# Patient Record
Sex: FEMALE
[De-identification: names, ages, dates, MRNs, and addresses within clinical notes are randomized per-mention and may not be internally consistent; named-entity substitution may affect disease eponyms.]

---

## 2019-01-26 ENCOUNTER — HOSPITAL ENCOUNTER (EMERGENCY)
Dept: HOSPITAL 31 - C.ER | Age: 19
LOS: 1 days | Discharge: HOME | End: 2019-01-27
Payer: SELF-PAY

## 2019-01-26 VITALS — OXYGEN SATURATION: 99 % | SYSTOLIC BLOOD PRESSURE: 102 MMHG | DIASTOLIC BLOOD PRESSURE: 70 MMHG

## 2019-01-26 DIAGNOSIS — O20.0: Primary | ICD-10-CM

## 2019-01-26 DIAGNOSIS — Z3A.01: ICD-10-CM

## 2019-01-26 LAB
ANISOCYTOSIS BLD QL SMEAR: SLIGHT
BASOPHILS # BLD AUTO: 0 K/UL (ref 0–0.2)
BASOPHILS NFR BLD: 0.3 % (ref 0–2)
BILIRUB UR-MCNC: NEGATIVE MG/DL
BUN SERPL-MCNC: 6 MG/DL (ref 7–17)
CALCIUM SERPL-MCNC: 8.9 MG/DL (ref 8.6–10.4)
EOSINOPHIL # BLD AUTO: 0 K/UL (ref 0–0.7)
EOSINOPHIL NFR BLD: 0.4 % (ref 0–4)
ERYTHROCYTE [DISTWIDTH] IN BLOOD BY AUTOMATED COUNT: 13.3 % (ref 11.5–14.5)
GFR NON-AFRICAN AMERICAN: > 60
GLUCOSE UR STRIP-MCNC: NORMAL MG/DL
HGB BLD-MCNC: 12 G/DL (ref 11–16)
HYPOCHROMIC: SLIGHT
LEUKOCYTE ESTERASE UR-ACNC: (no result) LEU/UL
LG PLATELETS BLD QL SMEAR: PRESENT
LYMPHOCYTE: 7 % (ref 20–40)
LYMPHOCYTES # BLD AUTO: 0.7 K/UL (ref 1–4.3)
LYMPHOCYTES NFR BLD AUTO: 7 % (ref 20–40)
MCH RBC QN AUTO: 28.4 PG (ref 27–31)
MCHC RBC AUTO-ENTMCNC: 33.6 G/DL (ref 33–37)
MCV RBC AUTO: 84.4 FL (ref 81–99)
MONOCYTE: 8 % (ref 0–10)
MONOCYTES # BLD: 0.8 K/UL (ref 0–0.8)
MONOCYTES NFR BLD: 8.4 % (ref 0–10)
NEUTROPHILS # BLD: 7.9 K/UL (ref 1.8–7)
NEUTROPHILS NFR BLD AUTO: 83.9 % (ref 50–75)
NEUTROPHILS NFR BLD AUTO: 84 % (ref 50–75)
NEUTS BAND NFR BLD: 1 % (ref 0–2)
NRBC BLD AUTO-RTO: 0 % (ref 0–2)
PH UR STRIP: 6 [PH] (ref 5–8)
PLATELET # BLD EST: NORMAL 10*3/UL
PLATELET # BLD: 241 K/UL (ref 130–400)
PMV BLD AUTO: 8.3 FL (ref 7.2–11.7)
POLYCHROMIC: SLIGHT
PROT UR STRIP-MCNC: NEGATIVE MG/DL
RBC # BLD AUTO: 4.22 MIL/UL (ref 3.8–5.2)
RBC # UR STRIP: NEGATIVE /UL
SP GR UR STRIP: 1.01 (ref 1–1.03)
SQUAMOUS EPITHIAL: 2 /HPF (ref 0–5)
TOTAL CELLS COUNTED BLD: 100
TOXIC GRANULES BLD QL SMEAR: PRESENT
UROBILINOGEN UR-MCNC: NORMAL MG/DL (ref 0.2–1)
WBC # BLD AUTO: 9.4 K/UL (ref 4.8–10.8)

## 2019-01-27 VITALS — TEMPERATURE: 99.1 F | HEART RATE: 110 BPM | RESPIRATION RATE: 20 BRPM

## 2019-01-27 NOTE — US
Pelvic ultrasound 



HISTORY:

Pregnancy.  Vaginal bleeding. 



Comparison: None available. 



Technique: Real-time sonography was performed through the pelvis. 



Findings: 



Uterus: 8.3 x 5.8 x 6.1 centimeters.  Heterogeneous echotexture.  

Anteverted. 



Endometrium is prominent measuring 1.7 centimeters. 



Cervix is closed measuring 3.1 centimeters. 



Intrauterine gestational sac measures 9 millimeters. 



Yolk sac measures 2.2 millimeters. 



No discrete fetal pole visualized. 



Suggestion of subchorionic hemorrhage adjacent to the gestational sac 

measuring 6 x 6 x 7 millimeters. 



No free fluid in the pelvic cul-de-sac. 



Right ovary: 3.3 x 2.6 x 2.3 centimeters.  Normal flow. Hypoechoic 

right ovarian cyst measuring 1.2 x 1.5 x 1.2 centimeters. 



Left ovary: 2.5 x 1.3 x 2.6 centimeters.  Normal flow. 



Impression: 



1. Suggestion of an intrauterine gestational sac measuring 9 

millimeters.  Suggestion of a yolk sac measuring 2.2 millimeters.  

These are too small to adequately date.  Continued interval follow-up 

is recommended.  Fetal pole or heart rate not visualized at this 

juncture. 



2. Heterogeneous focus adjacent to the gestational sac suggestive for 

possible subchorionic hemorrhage measuring up to 7 millimeters.  

Clinical correlation. 



3. Prominent endometrium measuring 1.7 centimeters. 



4. 1.5 centimeter right ovarian cyst. 



Limited 1st trimester ultrasound for viability purposes only.  

Continued interval followup with serial ultrasound, serial HCG levels,

 and gynecological consultation would be helpful if clinically 

indicated. 



A preliminary report was generated at 10:45 p.m. on 01/26/2019 by Dr. Clayton Bullard from [facility]

## 2021-05-30 NOTE — C.PDOC
History Of Present Illness


18 year old female presents to the ER with a complaint of vaginal bleeding 

described as vaginal spotting for the past 1 day. Patient is  with 1 

miscarriage. She is 5 weeks pregnant by LMP and has not seen anyone for this 

pregnancy. Denies any other complaints.


Chief Complaint (Nursing): Abdominal Pain


History Per: Patient


History/Exam Limitations: no limitations


Onset/Duration Of Symptoms: Days (1)


Current Symptoms Are (Timing): Still Present


Recent travel outside of the [place]: No


Abnormal Vaginal Bleeding: Yes





Past Medical History


Reviewed: Historical Data, Nursing Documentation, Vital Signs


Vital Signs: 





                                Last Vital Signs











Temp  99.1 F   19 00:02


 


Pulse  110 H  19 00:02


 


Resp  20   19 00:02


 


BP  102/70   19 21:17


 


Pulse Ox  99   19 00:02











Family History: States: No Known Family Hx





- Social History


Hx Alcohol Use: No


Hx Substance Use: No





- Immunization History


Hx Tetanus Toxoid Vaccination: No


Hx Influenza Vaccination: No


Hx Pneumococcal Vaccination: No





Review Of Systems


Constitutional: Negative for: Fever, Chills


Eyes: Negative for: Pain, Redness


ENT: Negative for: Mouth Swelling


Cardiovascular: Negative for: Chest Pain


Respiratory: Negative for: Cough, Shortness of Breath


Gastrointestinal: Negative for: Nausea, Vomiting, Diarrhea


Genitourinary: Positive for: Vaginal Bleeding.  Negative for: Dysuria


Musculoskeletal: Negative for: Back Pain


Skin: Negative for: Rash


Neurological: Negative for: Weakness, Numbness, Dizziness





Physical Exam





- Physical Exam


Appears: Well, Non-toxic, No Acute Distress


Skin: Normal Color, Warm


Head: Atraumatic, Normacephalic


Eye(s): bilateral: Normal Inspection, PERRL, EOMI


Oral Mucosa: Moist


Neck: Normal ROM, No Midline Cervical Tenderness, No Paracervical Tenderness, 

Supple


Chest: Symmetrical, No Tenderness


Respiratory: No Accessory Muscle Use, Other (Normal inspiratory effort)


Gastrointestinal/Abdominal: Soft, No Tenderness


Back: No CVA Tenderness


Pelvic: Other (Deferred after US showed early IUP at 5 weeks)


Neurological/Psych: Oriented x3, Normal Speech, Normal Cranial Nerves (Grossly 

intact)


Gait: Steady





ED Course And Treatment





- Laboratory Results


Result Diagrams: 


                                 19 22:57





                                 19 22:57


Lab Results: 





                                        











Urine Color  Yellow  (YELLOW)   19  22:16    


 


Urine Clarity  Clear  (Clear)   19  22:16    


 


Urine pH  6.0  (5.0-8.0)   19  22:16    


 


Ur Specific Gravity  1.008  (1.003-1.030)   19  22:16    


 


Urine Protein  Negative mg/dL (NEGATIVE)   19  22:16    


 


Urine Glucose (UA)  Normal mg/dL (Normal)   19  22:16    


 


Urine Ketones  Negative mg/dL (NEGATIVE)   19  22:16    


 


Urine Blood  Negative  (NEGATIVE)   19  22:16    


 


Urine Nitrate  Negative  (NEGATIVE)   19  22:16    


 


Urine Bilirubin  Negative  (NEGATIVE)   19  22:16    


 


Urine Urobilinogen  Normal mg/dL (0.2-1.0)   19  22:16    


 


Ur Leukocyte Esterase  Neg Timbo/uL (Negative)   19  22:16    


 


Urine WBC (Auto)  < 1 /hpf (0-5)   19  22:16    


 


Urine RBC (Auto)  1 /hpf (0-3)   19  22:16    


 


Ur Squamous Epith Cells  2 /hpf (0-5)   19  22:16    








                                        











Beta HCG, Quant  7375.20 mIU/ML  19  22:57    











O2 Sat by Pulse Oximetry: 99 (Room air)


Pulse Ox Interpretation: Normal





- CT Scan/US


  ** Transvaginal US


Other Rad Studies (CT/US): Read By Radiologist, Radiology Report Reviewed


CT/US Interpretation: CLINICAL HISTORY:  Vaginal bleeding with pregnancy.  The 

last menstrual period was on 2018.  The patient is G3, P0, 2 abortions.  

EGA by LMP is 5 weeks 3 days.  BLAIR by LMP is 09/15/2019.  TECHNIQUE:  Realtime 

sonographic images were obtained in multiple projections.  Color Doppler imaging

was obtained.  COMMENTS:  Yolk sac is identified measuring 2.2 mm.  Gestational 

sac is visualized with mean sac diameter of 9.1 mm.  No fetal pole or heart 

motion detected.  The uterus is anteverted without masses measuring 8.3 x 5.8 x 

6.1 cm.  The endometrial echo pattern is within normal limits measuring 1.7 cm. 

The cervical length measures 3.1 cm, closed.  Subchorionic bleed is seen 

measuring 6 x 6 x 7 mm.  There is no evidence of free fluid within the pelvic 

cul-de-sac.  The right ovary measures 3.3 x 2.6 x 2.3 cm.  The left ovary 

measures 2.5 x 1.3 x 2.6 cm.  The left ovary is free of masses.  Small right 

ovarian cyst is seen measuring 1.2 x 1.5 x 1.2 cm.  Blood flow is demonstrated 

within both ovaries.  IMPRESSION:  1.  Gestational sac and yolk sac only are 

seen.  2.  Subchorionic bleed.  3.  Small right ovarian cyst.





Medical Decision Making


Medical Decision Making: 


US showed early IUP at 5 weeks, beta at 7000, patient with no pain at this time,

advised to follow up at clinic in 2 days for repeat lab work.





Disposition


Counseled Patient/Family Regarding: Studies Performed, Diagnosis, Need For 

Followup





- Disposition


Referrals: 


[facility] [Outside]


Disposition: HOME/ ROUTINE


Disposition Time: 00:25


Condition: STABLE


Instructions:  Threatened Miscarriage (DC)


Forms:  Gen Discharge Inst Russian, [facility] (Russian)


Print Language: [place] citizen





- Clinical Impression


Clinical Impression: 


 Threatened  in first trimester








- PA / NP / Resident Statement


MD/DO has reviewed & agrees with the documentation as recorded.





- Scribe Statement


The provider has reviewed the documentation as recorded by the Scribmavis Figueroa





All medical record entries made by the Micheline were at my direction and 

personally dictated by me. I have reviewed the chart and agree that the record 

accurately reflects my personal performance of the history, physical exam, 

medical decision making, and the department course for this patient. I have also

 personally directed, reviewed, and agree with the discharge instructions and 

disposition. 30-May-2021 18:57